# Patient Record
Sex: MALE | Race: OTHER | HISPANIC OR LATINO | URBAN - METROPOLITAN AREA
[De-identification: names, ages, dates, MRNs, and addresses within clinical notes are randomized per-mention and may not be internally consistent; named-entity substitution may affect disease eponyms.]

---

## 2020-07-30 ENCOUNTER — EMERGENCY (EMERGENCY)
Facility: HOSPITAL | Age: 35
LOS: 1 days | Discharge: HOME | End: 2020-07-30
Attending: EMERGENCY MEDICINE
Payer: MEDICAID

## 2020-07-30 VITALS
HEART RATE: 65 BPM | RESPIRATION RATE: 18 BRPM | SYSTOLIC BLOOD PRESSURE: 136 MMHG | TEMPERATURE: 98 F | DIASTOLIC BLOOD PRESSURE: 84 MMHG | WEIGHT: 179.9 LBS

## 2020-07-30 VITALS
TEMPERATURE: 98 F | DIASTOLIC BLOOD PRESSURE: 71 MMHG | SYSTOLIC BLOOD PRESSURE: 120 MMHG | OXYGEN SATURATION: 98 % | HEART RATE: 71 BPM | RESPIRATION RATE: 18 BRPM

## 2020-07-30 PROCEDURE — 99283 EMERGENCY DEPT VISIT LOW MDM: CPT

## 2020-07-30 PROCEDURE — 99053 MED SERV 10PM-8AM 24 HR FAC: CPT

## 2020-07-30 RX ORDER — CIPROFLOXACIN HCL 0.3 %
10 DROPS OPHTHALMIC (EYE) ONCE
Refills: 0 | Status: COMPLETED | OUTPATIENT
Start: 2020-07-30 | End: 2020-07-30

## 2020-07-30 RX ORDER — CIPROFLOXACIN AND DEXAMETHASONE 3; 1 MG/ML; MG/ML
4 SUSPENSION/ DROPS AURICULAR (OTIC)
Qty: 1 | Refills: 0
Start: 2020-07-30 | End: 2020-08-05

## 2020-07-30 RX ORDER — ACETAMINOPHEN 500 MG
650 TABLET ORAL ONCE
Refills: 0 | Status: COMPLETED | OUTPATIENT
Start: 2020-07-30 | End: 2020-07-30

## 2020-07-30 RX ORDER — IBUPROFEN 200 MG
600 TABLET ORAL ONCE
Refills: 0 | Status: COMPLETED | OUTPATIENT
Start: 2020-07-30 | End: 2020-07-30

## 2020-07-30 RX ADMIN — Medication 600 MILLIGRAM(S): at 06:49

## 2020-07-30 RX ADMIN — Medication 650 MILLIGRAM(S): at 06:49

## 2020-07-30 RX ADMIN — Medication 10 DROP(S): at 06:49

## 2020-07-30 NOTE — ED ADULT NURSE NOTE - OBJECTIVE STATEMENT
Pt 36 y/o male, AxOx3, presents to ED complaining of left ear pain x 2 days s/p going to beach. Pt endorses left ear pain worsening w/ no relief with advil at home. Reports 9/10 pain at this time. Left ear appears red and swollen, tender to palpation while tension on pinna. Pt denies fevers/ chills, CP, SOB at this time. Pt is well appearing, speaking full sentences without difficulty. Breathing spontaneous and unlabored. Safety and comfort measures maintained. Call bell within reach.

## 2020-07-30 NOTE — ED PROVIDER NOTE - PHYSICAL EXAMINATION
Gen: well appearing middle aged male   HEENT: NCAT, EOMI, no nasal discharge, mucous membranes moist. no erythema to oropharynx. +L ear erythema and hyperemia no bleeding drainage from the ear. no mastoid tenderness. R ear TM clear nonbulging no erythema   CV: RRR, +S1/S2, no M/R/G  Resp: CTAB, no W/R/R  GI: Abdomen soft non-distended, NTTP, no masses  MSK: No open wounds, no bruising, no LE edema  Neuro: A&Ox4, following commands, moving all four extremities spontaneously  Psych: appropriate mood

## 2020-07-30 NOTE — ED PROVIDER NOTE - NSFOLLOWUPINSTRUCTIONS_ED_ALL_ED_FT
Use ciprodex - 3 drops in left ear 2x per day for 7 days for ear infection.     You can use 650mg Tylenol every 6 hours for pain - as needed.  This is an over-the-counter medications - please respect the warnings on the label. This medication come with certain risks and side effects that you need to discuss with your doctor, especially if you are taking it for a prolonged period.    You can use 400-600mg Ibuprofen (such as motrin or advil) every 6 to 8 hours as needed for pain control.  Take ibuprofen with food or milk to lessen stomach upset.  This is an over-the-counter medication please respect the warnings on the label. All medications come with certain risks and side effects that you need to discuss with your doctor, especially if you are taking them for a prolonged period.    Follow-up with Primary Care in 1 week for recheck.     Return to the ED for any worsening pain, fevers, drainage from the ear or any new concerning symptoms.

## 2020-07-30 NOTE — ED PROVIDER NOTE - ATTENDING CONTRIBUTION TO CARE
MD Mcdaniel:  patient seen and evaluated personally.   I agree with the History & Physical,  Impression & Plan other than what was detailed in my note.  MD Mcdaniel  36 y/o c/o 3 days left ear pain, slowly getting worse, non radiating, no assoicated ha, dc, visual changes, jaw pain, no f/c, n/v, felicia. afebrile vitals stalbe, non toxic, r ear unremarkable, tm clear, l ear has ttp of outer ear, erythema of canal, mild swelling, tm clear w/ no bulging. no mastoid ttp. plan for ciprodex drops, can fu outpt w/ pcp. no signs of severe disease at this point, no signs of interna.

## 2020-07-30 NOTE — ED PROVIDER NOTE - CLINICAL SUMMARY MEDICAL DECISION MAKING FREE TEXT BOX
34 y/o M no PMH presents to the ED with c/o L pain onset 3 days ago s/p swimming in the ocean associated with muffled hearing. No fevers, chills, HA, visual changes. No trauma to the ear. No bleeding. ddx otitis externa (most likely), otitis media, low suspicion mastoiditis no mastoid tenderness no fevers HA low suspicion perforation no bleeding otorrhea. plan dc with PMD f/u

## 2020-07-30 NOTE — ED PROVIDER NOTE - NSFOLLOWUPCLINICS_GEN_ALL_ED_FT
[facility] - Primary Care  Primary Care  [address] [place] [address]  Phone: [phone]  Fax:   Follow Up Time:

## 2020-07-30 NOTE — ED PROVIDER NOTE - OBJECTIVE STATEMENT
34 y/o M no PMH presents to the ED with c/o L pain onset 3 days ago s/p swimming in the ocean associated with muffled hearing. No fevers, chills, HA, visual changes. No trauma to the ear. No bleeding.

## 2020-07-30 NOTE — ED PROVIDER NOTE - PATIENT PORTAL LINK FT
You can access the FollowMyHealth Patient Portal offered by [facility] by registering at the following website: [email or web address]. By joining [facility]’s FollowMyHealth portal, you will also be able to view your health information using other applications (apps) compatible with our system.